# Patient Record
(demographics unavailable — no encounter records)

---

## 2025-03-03 NOTE — PHYSICAL EXAM

## 2025-03-03 NOTE — DISCUSSION/SUMMARY
[Normal Growth] : growth [Normal Development] : developmental [No Elimination Concerns] : elimination [Continue Regimen] : feeding [No Skin Concerns] : skin [Normal Sleep Pattern] : sleep [None] : no known medical problems [Anticipatory Guidance Given] : Anticipatory guidance addressed as per the history of present illness section [ Transition] :  transition [ Care] :  care [Nutritional Adequacy] : nutritional adequacy [Parental Well-Being] : parental well-being [Safety] : safety [Hepatitis B In Hospital] : Hepatitis B administered while in the hospital [No Vaccines] : no vaccines needed [No Medications] : ~He/She~ is not on any medications [Parent/Guardian] : Parent/Guardian [FreeTextEntry1] : 5d here for  visit gaining weight from discharge tcb 8.0 Recommend exclusive breastfeeding, 8-12 feedings per day. Mother should continue prenatal vitamins and avoid alcohol. If formula is needed, recommend iron-fortified formulations every 2-3 hrs. When in car, patient should be in rear-facing car seat in back seat. Air dry umbilical stump. Put baby to sleep on back, in own crib with no loose or soft bedding. Limit baby's exposure to others, especially those with fever or unknown vaccine status. vit d sent  rto 1 week for weight check or sooner prn

## 2025-03-03 NOTE — HISTORY OF PRESENT ILLNESS
[Born at ___ Wks Gestation] : The patient was born at [unfilled] weeks gestation [] : via normal spontaneous vaginal delivery [(1) _____] : [unfilled] [(5) _____] : [unfilled] [BW: _____] : weight of [unfilled] [Age: ___] : [unfilled] year old mother [G: ___] : G [unfilled] [P: ___] : P [unfilled] [Significant Hx: ____] : The mother's  medical history is significant for [unfilled] [GBS] : GBS positive [MBT: ____] : MBT - [unfilled] [DW: _____] : Discharge weight was [unfilled] [TcB: _____] : Transcutaneous Bilirubin [unfilled] mg/dL [Hepatitis B Vaccine Given] : Hepatitis B vaccine given [Nirsevimab Given] : Nirsevimab given  [HepBsAG] : HepBsAg negative [HepC] : Hepatitis C negative [Rubella (Immune)] : Rubella not immune [VDRL/RPR (Reactive)] : VDRL/RPR nonreactive [de-identified] : 24 [FreeTextEntry1] : 5days ex-39.3 wk   feeding well, breastmilk and formula (similac) in bottle  about 2oz every 2-3 hours  lots of urine/BM lives with grandma and parents sleeping in bassinet

## 2025-03-10 NOTE — HISTORY OF PRESENT ILLNESS
[FreeTextEntry6] : BW 7-0.5  breastmilk and formula  2oz every 2-3 hours  lots of wet diapers, BM 1-2x a day (skipped yesterday)

## 2025-03-10 NOTE — DISCUSSION/SUMMARY
[FreeTextEntry1] : 12d here for weight check past birth weight, feeding well continue vit d  rto for 1mo wcc or sooner prn

## 2025-03-18 NOTE — DISCUSSION/SUMMARY
[FreeTextEntry1] : Baby had a BM in the office Advised Mylicon for gas Wash eyes with warm water Massage inner corner of eye

## 2025-03-18 NOTE — HISTORY OF PRESENT ILLNESS
[FreeTextEntry6] : Major aranda had a BM for 3 dys Is very gassy Eyes are tearing . right eye has sticky discharge

## 2025-04-02 NOTE — PHYSICAL EXAM

## 2025-04-02 NOTE — DISCUSSION/SUMMARY
[Normal Growth] : growth [Normal Development] : development  [No Elimination Concerns] : elimination [Continue Regimen] : feeding [No Skin Concerns] : skin [Normal Sleep Pattern] : sleep [None] : no medical problems [Anticipatory Guidance Given] : Anticipatory guidance addressed as per the history of present illness section [Parental Well-Being] : parental well-being [Family Adjustment] : family adjustment [Feeding Routines] : feeding routines [Infant Adjustment] : infant adjustment [Safety] : safety [No Medications] : ~He/She~ is not on any medications [Parent/Guardian] : Parent/Guardian [] : The components of the vaccine(s) to be administered today are listed in the plan of care. The disease(s) for which the vaccine(s) are intended to prevent and the risks have been discussed with the caretaker.  The risks are also included in the appropriate vaccination information statements which have been provided to the patient's caregiver.  The caregiver has given consent to vaccinate. [FreeTextEntry1] : 1mo here for well visit, growing and developing well  - hep B #2 given today - NBS with hemoglobin D trait  - discussed nutrition, elimination, safe sleep, car safety, avoiding illness - vit D - RTO for 2mo wcc or sooner prn

## 2025-04-21 NOTE — DISCUSSION/SUMMARY
[FreeTextEntry1] : very active and well-appearing mom showed video, does appear to be true diarrhea is gaining weight from appt 4/2 ?CMPI, advised to drop off diaper for occult blood testing will trial hypoallergenic formula nystatin for diaper rash, calmoseptine RTO/call for new/worsening symptoms or as needed

## 2025-04-21 NOTE — HISTORY OF PRESENT ILLNESS
[FreeTextEntry6] : diarrhea 4-5x a day for past 4-5 days takes similac 2-3oz every 2-3 hours, mixing properly, no change in formula supplementing with pedialyte   diaper rash - using aquaphor   no one sick at home no fever

## 2025-04-30 NOTE — PHYSICAL EXAM
[Alert] : alert [Acute Distress] : no acute distress [Normocephalic] : normocephalic [Flat Open Anterior [place]] : flat open anterior fontanelle [PERRL] : PERRL [Red Reflex Bilateral] : red reflex bilateral [Normally Placed Ears] : normally placed ears [Auricles Well Formed] : auricles well formed [Clear Tympanic membranes] : clear tympanic membranes [Light reflex present] : light reflex present [Bony landmarks visible] : bony landmarks visible [Discharge] : no discharge [Nares Patent] : nares patent [Palate Intact] : palate intact [Uvula Midline] : uvula midline [Supple, full passive range of motion] : supple, full passive range of motion [Palpable Masses] : no palpable masses [Symmetric Chest Rise] : symmetric chest rise [Clear to Auscultation Bilaterally] : clear to auscultation bilaterally [Regular Rate and Rhythm] : regular rate and rhythm [S1, S2 present] : S1, S2 present [Murmurs] : no murmurs [+2 Femoral Pulses] : +2 femoral pulses [Soft] : soft [Tender] : nontender [Distended] : not distended [Bowel Sounds] : bowel sounds present [Hepatomegaly] : no hepatomegaly [Splenomegaly] : no splenomegaly [Normal external genitailia] : normal external genitalia [Clitoromegaly] : no clitoromegaly [Patent Vagina] : vagina patent [Normally Placed] : normally placed [No Abnormal Lymph Nodes Palpated] : no abnormal lymph nodes palpated [[place]ice[place]-Ortolani] : negative [place]-Ortolani [Symmetric Flexed Extremities] : symmetric flexed extremities [Spinal Dimple] : no spinal dimple [Tuft of Hair] : no tuft of hair [Startle Reflex] : startle reflex present [Suck Reflex] : suck reflex present [Rooting] : rooting reflex present [Palmar Grasp] : palmar grasp reflex present [Plantar Grasp] : plantar grasp reflex present [Symmetric [place]] : symmetric [place] [Rash and/or lesion present] : no rash/lesion

## 2025-04-30 NOTE — DISCUSSION/SUMMARY
[Normal Growth] : growth [Normal Development] : development  [No Elimination Concerns] : elimination [Continue Regimen] : feeding [No Skin Concerns] : skin [Normal Sleep Pattern] : sleep [None] : no medical problems [Anticipatory Guidance Given] : Anticipatory guidance addressed as per the history of present illness section [Parental (Maternal) Well-Being] : parental (maternal) well-being [Infant-Family Synchrony] : infant-family synchrony [Nutritional Adequacy] : nutritional adequacy [Infant Behavior] : infant behavior [Safety] : safety [Age Approp Vaccines] : Age appropriate vaccines administered [No Medications] : ~He/She~ is not on any medications [Parent/Guardian] : Parent/Guardian [] : The components of the vaccine(s) to be administered today are listed in the plan of care. The disease(s) for which the vaccine(s) are intended to prevent and the risks have been discussed with the caretaker.  The risks are also included in the appropriate vaccination information statements which have been provided to the patient's caregiver.  The caregiver has given consent to vaccinate. [FreeTextEntry1] : 2 month F here for well visit. No acute concerns for growth or development. - pentacel #1, prevnar #1, rota #1 given today  - discussed nutrition, elimination, car safety, safe sleep, milestones - history consistent with CMPI - continue on alimentum can consider trialing similac after next well visit at 4mo - vit d drops - RTO for 4mo wcc or sooner prn

## 2025-04-30 NOTE — HISTORY OF PRESENT ILLNESS
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